# Patient Record
(demographics unavailable — no encounter records)

---

## 2024-12-26 NOTE — HISTORY OF PRESENT ILLNESS
[FreeTextEntry1] : 18 y/o M referred for initial evaluation of syncopal episodes occurring monthly.   Started 06/2022, cold sweats, head spinning, vision blackening, then syncopal episode. No convulsions noted afterwards. Infrequent initially but then 02/2024 became monthly. 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 Triggers are salty foods the night prior and feeling dehydrated. His most recent episode was in 11/2024 had fainting episode with fall in knee scraping. Felt dizzy while in seated position in school bus, remained persistent and when he got out of the bus, proceeded to have diaphoresis, head spinning, blackening of vision, then syncopal episode.   Diet: Vegetarian- yogurt, grains, berries, 9 cups of water daily SHx: Lives with parents, sister. Excels in school, 6 AP classes in 12th grade at [employer] . Doing well, strives to become a dermatologist.

## 2024-12-26 NOTE — REASON FOR VISIT
[Initial Consultation] : an initial consultation for [Syncope] : syncope [Family Member] : family member [Patient] : patient

## 2024-12-26 NOTE — CONSULT LETTER
[Dear  ___] : Dear  [unfilled], [Consult Letter:] : I had the pleasure of evaluating your patient, [unfilled]. [( Thank you for referring [unfilled] for consultation for _____ )] : Thank you for referring [unfilled] for consultation for [unfilled] [Please see my note below.] : Please see my note below. [Consult Closing:] : Thank you very much for allowing me to participate in the care of this patient.  If you have any questions, please do not hesitate to contact me. [Sincerely,] : Sincerely, [FreeTextEntry3] : Dr. Chano Martinez MD Attending Physician Pediatric Neurology and Epilepsy

## 2024-12-26 NOTE — ASSESSMENT
[FreeTextEntry1] : 18 y/o M presenting for initial evaluation of syncopal episodes. Neurologic examination non focal. The episodes are consistent with vasovagal episodes that have become recurrent, but will rule out an underlying risk for seizures at this time. If normal, RTC PRN.

## 2024-12-26 NOTE — PHYSICAL EXAM
[Well-appearing] : well-appearing [Alert] : alert [Well related, good eye contact] : well related, good eye contact [Conversant] : conversant [Normal speech and language] : normal speech and language [Follows instructions well] : follows instructions well [VFF] : VFF [Pupils reactive to light and accommodation] : pupils reactive to light and accommodation [Full extraocular movements] : full extraocular movements [Saccadic and smooth pursuits intact] : saccadic and smooth pursuits intact [No nystagmus] : no nystagmus [Normal facial sensation to light touch] : normal facial sensation to light touch [No facial asymmetry or weakness] : no facial asymmetry or weakness [Gross hearing intact] : gross hearing intact [Equal palate elevation] : equal palate elevation [Good shoulder shrug] : good shoulder shrug [Normal tongue movement] : normal tongue movement [Normal axial and appendicular muscle tone] : normal axial and appendicular muscle tone [Gets up on table without difficulty] : gets up on table without difficulty [No pronator drift] : no pronator drift [Normal finger tapping and fine finger movements] : normal finger tapping and fine finger movements [No abnormal involuntary movements] : no abnormal involuntary movements [5/5 strength in proximal and distal muscles of arms and legs] : 5/5 strength in proximal and distal muscles of arms and legs [Walks and runs well] : walks and runs well [Able to walk on heels] : able to walk on heels [Able to walk on toes] : able to walk on toes [2+ biceps] : 2+ biceps [Triceps] : triceps [Knee jerks] : knee jerks [Ankle jerks] : ankle jerks [No ankle clonus] : no ankle clonus [Localizes LT and temperature] : localizes LT and temperature [No dysmetria on FTNT] : no dysmetria on FTNT [Good walking balance] : good walking balance [Normal gait] : normal gait [Able to tandem well] : able to tandem well [Negative Romberg] : negative Romberg [No papilledema] : no papilledema